# Patient Record
Sex: MALE | Race: ASIAN | NOT HISPANIC OR LATINO | URBAN - METROPOLITAN AREA
[De-identification: names, ages, dates, MRNs, and addresses within clinical notes are randomized per-mention and may not be internally consistent; named-entity substitution may affect disease eponyms.]

---

## 2023-04-14 ENCOUNTER — EMERGENCY (EMERGENCY)
Facility: HOSPITAL | Age: 43
LOS: 1 days | Discharge: HOME | End: 2023-04-14
Admitting: EMERGENCY MEDICINE
Payer: COMMERCIAL

## 2023-04-14 VITALS
DIASTOLIC BLOOD PRESSURE: 81 MMHG | TEMPERATURE: 99 F | HEART RATE: 68 BPM | SYSTOLIC BLOOD PRESSURE: 129 MMHG | OXYGEN SATURATION: 97 % | RESPIRATION RATE: 16 BRPM
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
## 2023-04-14 DIAGNOSIS — S80.812A ABRASION, LEFT LOWER LEG, INITIAL ENCOUNTER: ICD-10-CM
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
## 2023-04-14 DIAGNOSIS — R51.9 HEADACHE, UNSPECIFIED: ICD-10-CM

## 2023-04-14 DIAGNOSIS — G89.11 ACUTE PAIN DUE TO TRAUMA: ICD-10-CM

## 2023-04-14 PROCEDURE — 73590 X-RAY EXAM OF LOWER LEG: CPT | Mod: 26,LT

## 2023-04-14 PROCEDURE — 73590 X-RAY EXAM OF LOWER LEG: CPT

## 2023-04-14 PROCEDURE — G1004: CPT

## 2023-04-14 PROCEDURE — 99284 EMERGENCY DEPT VISIT MOD MDM: CPT | Mod: 25

## 2023-04-14 PROCEDURE — 70450 CT HEAD/BRAIN W/O DYE: CPT | Mod: 26,MG

## 2023-04-14 PROCEDURE — 99284 EMERGENCY DEPT VISIT MOD MDM: CPT

## 2023-04-14 PROCEDURE — 70450 CT HEAD/BRAIN W/O DYE: CPT | Mod: MG

## 2023-04-14 RX ORDER — ACETAMINOPHEN 500 MG
650 TABLET ORAL ONCE
Refills: 0 | Status: COMPLETED | OUTPATIENT
Start: 2023-04-14 | End: 2023-04-14

## 2023-04-14 RX ADMIN — Medication 650 MILLIGRAM(S): at 17:49

## 2023-04-14 NOTE — ED PROVIDER NOTE - NSFOLLOWUPINSTRUCTIONS_ED_ALL_ED_FT
General Assault  Assault includes any behavior or physical attack that results in injury or threat to another person or damage to their property. This also includes assault that has not yet happened but is planned to happen, as well as threats that cause fear of assault.    Threats of assault may be physical, verbal, or written. They may be spoken or sent by any form of communication or media. The threats may be direct, implied, or understood.    What are the different forms of assault?  Forms of assault include:  Physically assaulting a person directly by slapping, hitting, kicking, or pushing.  Threats to inflict physical harm, which may include:  Verbal threats with language that is intimidating, hostile, or abusive.  Throwing or hitting objects.  Making intimidating or threatening gestures.  Displaying an object that appears to be a weapon in a threatening manner.  Stalking.  Sexually assaulting a person. Sexual assault is any sexual activity that a person is forced, threatened, or coerced to participate in. It may or may not involve physical contact with the person who is assaulting you. You are sexually assaulted if you are forced to have sexual contact of any kind.  Damaging or destroying a person's assistive equipment, such as glasses, canes, or walkers.  Using or displaying a weapon to harm or threaten someone. Examples of weapons may include guns, knives, sticks, or bats.  Using greater physical size or strength to intimidate someone by restraining them with force or bullying.  What can I do if I experience assault?    Report assaults, threats, and stalking to the police. Call your local emergency services (911 in the [place] if you are in immediate danger or you need medical help.  Work with a  or an advocate to get legal protection against someone who has assaulted you or threatened you with assault. Protection options may include:  Getting a court order that requires the person to stay away from you (restraining order).  Moving you to a private address.  Prosecuting the person through the courts. Laws vary depending on where you live.  Follow these instructions at home:  Avoid areas where you feel unsafe.  Try to stay in areas that are around other people.  Consider learning methods of protection from assault, such as self-defense.  Where to find support    If you have experienced assault, you may seek help from:  A professional counselor, family member, clergy, or a trusted friend to talk about what happened.  [facility] Sexual Assault Hotline: 943.289.4587 ([facility]). Live chat is also available at [employer]  The National Center for Victims of Crime. This is an advocacy center that provides information for people who have been assaulted or subjected to violence. Visit www.victimsofcrime.org  Summary  Assault includes any behavior or physical attack that results in injury or threat to another person or damage to their property.  An assault includes threats that cause a person to fear for his or her safety. Threats may be spoken or sent by any form of communication or media.  There are many forms of assault.  Report assaults, threats, and stalking to the police. Call your local emergency services (911 in the [place] if you are in immediate danger or you need medical help.  Prevent assault by being aware of your surroundings, avoiding areas where you feel unsafe, and talking to a  about getting legal protection against someone who has assaulted you or threatened you with assault.  This information is not intended to replace advice given to you by your health care provider. Make sure you discuss any questions you have with your health care provider.
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
## 2023-04-14 NOTE — ED ADULT NURSE NOTE - NSIMPLEMENTINTERV_GEN_ALL_ED
Implemented All Fall Risk Interventions:  [place] to call system. Call bell, personal items and telephone within reach. Instruct patient to call for assistance. Room bathroom lighting operational. Non-slip footwear when patient is off stretcher. Physically safe environment: no spills, clutter or unnecessary equipment. Stretcher in lowest position, wheels locked, appropriate side rails in place. Provide visual cue, wrist band, yellow gown, etc. Monitor gait and stability. Monitor for mental status changes and reorient to person, place, and time. Review medications for side effects contributing to fall risk. Reinforce activity limits and safety measures with patient and family.

## 2023-04-14 NOTE — ED PROVIDER NOTE - PATIENT PORTAL LINK FT
You can access the FollowMyHealth Patient Portal offered by [facility] by registering at the following website: [email or web address]. By joining [facility]’s FollowMyHealth portal, you will also be able to view your health information using other applications (apps) compatible with our system.
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
## 2023-04-14 NOTE — ED PROVIDER NOTE - MUSCULOSKELETAL, MLM
Spine appears normal, no midline spine tenderness, +mild tenderness to lower left shin with superficial abrasion

## 2024-08-23 NOTE — ED ADULT TRIAGE NOTE - BP NONINVASIVE DIASTOLIC (MM HG)
Health Maintenance       Hepatitis B Vaccine (1 of 3 - 19+ 3-dose series)  Never done    COVID-19 Vaccine (1 - 2023-24 season)  Never done           Following review of the above:  Patient is not proceeding with: COVID-19 and Hep B    Note: Refer to final orders and clinician documentation.       81